# Patient Record
Sex: FEMALE | Race: WHITE | Employment: FULL TIME | URBAN - METROPOLITAN AREA
[De-identification: names, ages, dates, MRNs, and addresses within clinical notes are randomized per-mention and may not be internally consistent; named-entity substitution may affect disease eponyms.]

---

## 2018-05-31 ENCOUNTER — HOSPITAL ENCOUNTER (OUTPATIENT)
Dept: WOMENS IMAGING | Age: 48
Discharge: HOME | End: 2018-05-31
Attending: OBSTETRICS & GYNECOLOGY | Admitting: OBSTETRICS & GYNECOLOGY

## 2018-05-31 DIAGNOSIS — Z12.31 VISIT FOR SCREENING MAMMOGRAM: ICD-10-CM

## 2018-06-11 ENCOUNTER — HOSPITAL ENCOUNTER (OUTPATIENT)
Dept: WOMENS IMAGING | Age: 48
Discharge: HOME | End: 2018-06-11
Attending: OBSTETRICS & GYNECOLOGY | Admitting: OBSTETRICS & GYNECOLOGY

## 2018-06-11 DIAGNOSIS — N64.89 BREAST ASYMMETRY: ICD-10-CM

## 2019-07-15 ENCOUNTER — HOSPITAL ENCOUNTER (OUTPATIENT)
Dept: WOMENS IMAGING | Age: 49
Discharge: HOME | End: 2019-07-15
Payer: COMMERCIAL

## 2019-07-15 DIAGNOSIS — Z12.31 VISIT FOR SCREENING MAMMOGRAM: ICD-10-CM

## 2019-07-15 PROCEDURE — 77063 BREAST TOMOSYNTHESIS BI: CPT

## 2020-08-25 ENCOUNTER — HOSPITAL ENCOUNTER (OUTPATIENT)
Dept: WOMENS IMAGING | Age: 50
Discharge: HOME | End: 2020-08-25
Payer: COMMERCIAL

## 2020-08-25 PROCEDURE — 77063 BREAST TOMOSYNTHESIS BI: CPT

## 2021-11-24 ENCOUNTER — HOSPITAL ENCOUNTER (OUTPATIENT)
Dept: WOMENS IMAGING | Age: 51
Discharge: HOME | End: 2021-11-24
Payer: COMMERCIAL

## 2021-11-24 DIAGNOSIS — Z12.31 ENCOUNTER FOR SCREENING MAMMOGRAM FOR MALIGNANT NEOPLASM OF BREAST: ICD-10-CM

## 2021-11-24 PROCEDURE — 77063 BREAST TOMOSYNTHESIS BI: CPT

## 2022-09-07 ENCOUNTER — TELEPHONE (OUTPATIENT)
Dept: CARDIOLOGY CLINIC | Age: 52
End: 2022-09-07

## 2022-09-07 NOTE — TELEPHONE ENCOUNTER
Left message for patient requesting a return call to schedule an echo for non rheumatic mitral prolapse and palpitations per referral from [facility]. Auth# [id number] Valid 9/28/22.

## 2022-09-09 ENCOUNTER — PROCEDURE VISIT (OUTPATIENT)
Dept: CARDIOLOGY CLINIC | Age: 52
End: 2022-09-09
Payer: COMMERCIAL

## 2022-09-09 DIAGNOSIS — R00.2 HEART PALPITATIONS: Primary | ICD-10-CM

## 2022-09-09 DIAGNOSIS — I34.1 MVP (MITRAL VALVE PROLAPSE): ICD-10-CM

## 2022-09-09 LAB
LV EF: 58 %
LVEF MODALITY: NORMAL

## 2022-09-09 PROCEDURE — 93306 TTE W/DOPPLER COMPLETE: CPT | Performed by: INTERNAL MEDICINE

## 2022-10-05 ENCOUNTER — TELEPHONE (OUTPATIENT)
Dept: CARDIOLOGY CLINIC | Age: 52
End: 2022-10-05

## 2022-10-05 NOTE — TELEPHONE ENCOUNTER
Type of Study      TTE procedure:ECHOCARDIOGRAM COMPLETE 2D W DOPPLER W COLOR. Procedure Date  Date: 09/09/2022 Start: 08:15 AM     Study Location: [address]  Technical Quality: Fair visualization     Indications:Palpitations. Patient Status: Routine     Height: 64 inches Weight: 146 pounds BSA: 1.71 m2 BMI: 25.06 kg/m2     Rhythm: Sinus rhythm HR: 77 bpm BP: 130/80 mmHg      Conclusions      Summary   Left ventricular function and size is normal, EF is estimated at 55-60%. No evidence of diastolic dysfunction. No regional wall motion abnormalities were detected. Mild mitral and tricuspid regurgitation is present. RVSP is 29 mmHg. No evidence of pericardial effusion. Essentially normal echo. Signature      ------------------------------------------------------------------   Electronically signed by Rosaline Thompson MD (Interpreting   physician) on 09/09/2022 at 05:27 PM   ------------------------------------------------------------------      Findings      Left Ventricle   Left ventricular function and size is normal, EF is estimated at 55-60%. No evidence of diastolic dysfunction. No regional wall motion abnormalities were detected. Left Atrium   Normal left atrium size and structure. Right Atrium   Normal right atrium size and structure. Right Ventricle   Essentially normal right ventricle. Aortic Valve   Normal aortic valve structure and function. Mitral Valve   Normal mitral valve structure and function. Mild mitral regurgitation is present. Tricuspid Valve   Normal tricuspid valve structure and function. Mild tricuspid regurgitation; RVSP is 29 mmHg. Pulmonic Valve   The pulmonic valve was not well visualized. Pericardial Effusion   No evidence of pericardial effusion. Pleural Effusion   No evidence of pleural effusion. Miscellaneous   No abnormalities were noted in the IVC, abdominal aorta, or aortic root. M-Mode/2D Measurements & Calculations      LV Diastolic Dimension:  LV Systolic Dimension:  LA Dimension: 3.1 cmAO Root   4.72 cm                  3.89 cm                 Dimension: 2.8 cmLA Area:   LV FS:17.6 %             LV Volume Diastolic: 66 8.36 cm2   LV PW Diastolic: 5.67 cm ml   LV PW Systolic: 0.77 cm  LV Volume Systolic: 29   Septum Diastolic: 0.9 cm ml   Septum Systolic: 7.58 cm LV EDV/LV EDV Index: 66 RV Diastolic Dimension:   CO: 5.08 l/min           ml/39 m2LV ESV/LV ESV   2.52 cm   CI: 2.97 l/m*m2          Index: 29 ml/17 m2                            EF Calculated (A4C):    LA/Aorta: 1.11   LV Area Diastolic: 54.6  49.1 %   cm2                      EF Calculated (2D):     LA volume/Index: 16 ml /1C6   LV Area Systolic: 54.4   78.8 %   cm2                            LV Length: 6.08 cm                               LVOT: 2 cm     Doppler Measurements & Calculations      MV Peak E-Wave: 85.9 AV Peak Velocity: 139 cm/s    LVOT Mean Velocity: 61.6   cm/s                 AV Peak Gradient: 7.73 mmHg   cm/s   MV Peak A-Wave: 84.9 AV Mean Velocity: 87.5 cm/s   LVOT Mean Gradient: 2   cm/s                 AV Mean Gradient: 4 mmHg      mmHg   MV E/A Ratio: 1.01   AV VTI: 26.8 cm               Estimated RVSP: 29 mmHg   MV Peak Gradient:    AV Area (Continuity):2.46 cm2 Estimated RAP:3 mmHg   2.95 mmHg                        LVOT VTI: 21 cm   MV P1/2t: 59 msec                                  TR Velocity:254 cm/s   MVA by PHT:3.73 cm2  Estimated PASP: 28.81 mmHg    TR Gradient:25.81 mmHg      MV E' Lateral   Velocity: 10.3 cm/s   MV A' Lateral   Velocity: 7.51 cm/s   MV E/E' lateral:   8.34

## 2023-01-23 ENCOUNTER — HOSPITAL ENCOUNTER (OUTPATIENT)
Dept: WOMENS IMAGING | Age: 53
Discharge: HOME | End: 2023-01-23
Payer: COMMERCIAL

## 2023-01-23 DIAGNOSIS — Z12.31 SCREENING MAMMOGRAM FOR BREAST CANCER: ICD-10-CM

## 2023-01-23 PROCEDURE — 77063 BREAST TOMOSYNTHESIS BI: CPT

## 2024-01-30 ENCOUNTER — HOSPITAL ENCOUNTER (OUTPATIENT)
Dept: WOMENS IMAGING | Age: 54
Discharge: HOME | End: 2024-01-30
Payer: COMMERCIAL

## 2024-01-30 VITALS — BODY MASS INDEX: 25.61 KG/M2 | WEIGHT: 150 LBS | HEIGHT: 64 IN

## 2024-01-30 DIAGNOSIS — Z12.31 ENCOUNTER FOR SCREENING MAMMOGRAM FOR MALIGNANT NEOPLASM OF BREAST: ICD-10-CM

## 2024-01-30 PROCEDURE — 77063 BREAST TOMOSYNTHESIS BI: CPT

## 2025-02-04 ENCOUNTER — HOSPITAL ENCOUNTER (OUTPATIENT)
Dept: WOMENS IMAGING | Age: 55
Discharge: HOME | End: 2025-02-04
Payer: COMMERCIAL

## 2025-02-04 VITALS — BODY MASS INDEX: 27.31 KG/M2 | WEIGHT: 160 LBS | HEIGHT: 64 IN

## 2025-02-04 DIAGNOSIS — Z12.31 ENCOUNTER FOR SCREENING MAMMOGRAM FOR BREAST CANCER: ICD-10-CM

## 2025-02-04 PROCEDURE — 77067 SCR MAMMO BI INCL CAD: CPT
